# Patient Record
Sex: MALE | URBAN - METROPOLITAN AREA
[De-identification: names, ages, dates, MRNs, and addresses within clinical notes are randomized per-mention and may not be internally consistent; named-entity substitution may affect disease eponyms.]

---

## 2020-08-04 ENCOUNTER — COMMUNICATION - RIVER FALLS (OUTPATIENT)
Dept: FAMILY MEDICINE | Facility: CLINIC | Age: 85
End: 2020-08-04

## 2022-02-16 NOTE — TELEPHONE ENCOUNTER
---------------------  From: Amalia Perez CMA (Phone Messages Pool ([id number]))   To: [facility] Message Pool ([id number]);     Sent: 2020 4:41:52 PM CDT !  Subject: Phone Message, bleeding concern.     Phone Message    PCP:   CHT       Time of Call:  1629.       Person Calling:  Wife  Phone number:  [phone]    Returned call at: 1630    Note:   Pt is on a blood thinner and bit his tongue, now they can't get the bleeding to stop tried a tea bag and ice cub.    Return call, instructed to apply pressure to the tongue with a cold compress. Please advise, further supportive cares.    Last office visit and reason:---------------------  From: Adri Romo CMA ([facility] Message Pool ([id number]))   To: Neal Jara MD;     Sent: 2020 4:54:48 PM CDT  Subject: FW: Phone Message, bleeding concern.---------------------  From: Neal Jara MD   To: [facility] Message Pool ([id number]);     Sent: 2020 2:40:26 PM CDT  Subject: RE: Phone Message, bleeding concern.     Called and they got it stopped. Patient noted in chart as .